# Patient Record
Sex: FEMALE
[De-identification: names, ages, dates, MRNs, and addresses within clinical notes are randomized per-mention and may not be internally consistent; named-entity substitution may affect disease eponyms.]

---

## 2017-06-16 ENCOUNTER — HOSPITAL ENCOUNTER (EMERGENCY)
Dept: HOSPITAL 31 - C.ER | Age: 47
Discharge: HOME | End: 2017-06-16
Payer: MEDICAID

## 2017-06-16 VITALS
SYSTOLIC BLOOD PRESSURE: 154 MMHG | DIASTOLIC BLOOD PRESSURE: 87 MMHG | OXYGEN SATURATION: 100 % | RESPIRATION RATE: 13 BRPM | TEMPERATURE: 97.6 F | HEART RATE: 99 BPM
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
## 2017-06-16 DIAGNOSIS — L08.9: ICD-10-CM

## 2017-06-16 DIAGNOSIS — S10.96XA: Primary | ICD-10-CM

## 2017-06-16 NOTE — C.PDOC
History Of Present Illness


48 y/o female c/o itching to left side of neck since this morning. pt has been 

scratching area incessantly.  pt believes she was bitten by something last 

night. pt denies any difficulty swallowing or  breathing. no fever or chills. 


Time Seen by Provider: 06/16/17 12:27


Chief Complaint (Nursing): Abnormal Skin Integrity


History Per: Patient


History/Exam Limitations: no limitations


Onset/Duration Of Symptoms: Days (1)


Current Symptoms Are (Timing): Worse


Location Of Injury: Left: Neck


Quality Of Symptoms: Painful, Swollen


Severity: Moderate





Past Medical History


Reviewed: Historical Data, Nursing Documentation, Vital Signs


Vital Signs: 


 Last Vital Signs











Temp  97.6 F   06/16/17 12:22


 


Pulse  99 H  06/16/17 12:22


 


Resp  13   06/16/17 12:22


 


BP  154/87 H  06/16/17 12:22


 


Pulse Ox  100   06/16/17 12:54














- Medical History


PMH: HTN


Surgical History: No Surg Hx


Family History: States: Unknown Family Hx





- Social History


Hx Alcohol Use: No


Hx Substance Use: No





Review Of Systems


Constitutional: Negative for: Fever, Chills


ENT: Negative for: Ear Pain, Nose Pain, Mouth Pain, Throat Pain, Throat Swelling


Respiratory: Negative for: Shortness of Breath


Skin: Positive for: Other (itchy area left neck)





Physical Exam





- Physical Exam


Appears: Non-toxic, No Acute Distress


Skin: Normal Color, Warm, Dry, Other (1 cm tall x 4 cm wide area of erythema, 

warmth, edema with punctate janice on left side of neck. )


Head: Atraumatic, Normacephalic


Eye(s): bilateral: Normal Inspection


Oral Mucosa: Moist


Tongue: Normal Appearing


Throat: Normal, No Erythema


Neck: Normal ROM, Other (swelling to left side  neck- see description in skin)





ED Course And Treatment


O2 Sat by Pulse Oximetry: 100





Medical Decision Making


Medical Decision Making: 





pt given rx for topical hydrocortisone for itch and po keflex; pt advised to 

return to ed asap for any worse swelling. difficulty breathing or swallowing. 





Disposition


Counseled Patient/Family Regarding: Diagnosis, Need For Followup, Rx Given





- Disposition


Referrals: 


Casper Paredes MD [Staff Provider] - 


Disposition: HOME/ ROUTINE


Disposition Time: 12:46


Condition: STABLE


Additional Instructions: 


Apply hydrocortisone cream to neck 2 times a day. Apply cold packs to left side 

neck (over thin cloth) several times a day to help with swelling. Take 

antibiotic as prescribed.  FOlow up with Dr Paredes on Monday. Return to ER if 

swelling or redness increase, you develop fever, have any difficulty breathing 

or swallowing. 


Prescriptions: 


Cephalexin [cephalexin] 500 mg PO QID #28 cap


Hydrocortisone 1% Cream [Cortizone 1% Cream] 1 applic TOP BID #1 tube


Instructions:  Insect Bite or Sting (ED)


Forms:  General Discharge Instructions





- Clinical Impression


Clinical Impression: 


 Insect bite of neck, infected

## 2018-07-22 ENCOUNTER — HOSPITAL ENCOUNTER (EMERGENCY)
Dept: HOSPITAL 31 - C.ER | Age: 48
Discharge: HOME | End: 2018-07-22
Payer: MEDICAID

## 2018-07-22 VITALS
SYSTOLIC BLOOD PRESSURE: 137 MMHG | DIASTOLIC BLOOD PRESSURE: 84 MMHG | HEART RATE: 98 BPM | TEMPERATURE: 98.2 F | RESPIRATION RATE: 18 BRPM | OXYGEN SATURATION: 100 %

## 2018-07-22 DIAGNOSIS — K02.9: Primary | ICD-10-CM

## 2018-07-22 NOTE — C.PDOC
History Of Present Illness


48 year old female presents to the ED complaining of dental pain that began 2 

days ago. Patient states she felt pain to the left side of mouth when she was 

eating on Friday night. This morning she woke up with swelling prompting ED 

visit. She denies any fever/chills, trouble breathing or swallowing. She 

reports she will go to the dentist tomorrow. 


Time Seen by Provider: 07/22/18 13:18


Chief Complaint (Nursing): Dental Pain


History Per: Patient


History/Exam Limitations: no limitations


Onset/Duration Of Symptoms: Days


Current Symptoms Are (Timing): Still Present


Severity: Moderate


Quality: Positive for: "Pain"





Past Medical History


Reviewed: Historical Data, Nursing Documentation, Vital Signs


Vital Signs: 


 Last Vital Signs











Temp  98.2 F   07/22/18 13:25


 


Pulse  98 H  07/22/18 13:25


 


Resp  18   07/22/18 14:05


 


BP  137/84   07/22/18 13:25


 


Pulse Ox  100   07/22/18 14:12














- Medical History


PMH: HTN


Surgical History: No Surg Hx


Family History: States: No Known Family Hx





- Social History


Hx Alcohol Use: No


Hx Substance Use: No





Review Of Systems


Except As Marked, All Systems Reviewed And Found Negative.


Constitutional: Negative for: Fever, Chills


ENT: Positive for: Mouth Pain (Left side)


Respiratory: Negative for: Shortness of Breath





Physical Exam





- Physical Exam


Appears: Non-toxic, No Acute Distress


Skin: Normal Color, Warm, Dry


Head: Atraumatic, Normacephalic


Eye(s): bilateral: Normal Inspection, EOMI


Ear(s): Bilateral: Normal


Nose: Normal


Oral Mucosa: Moist


Teeth: No Normal Dentition (Poor dentition ), Caries (Large jayme ), Tender To 

Palpation (Tenderness to left mandibular premolar)


Gingiva: Erythema, Swelling, Tender (Tenderness to left mandibular premolar), 

No Abscess


Throat: No Erythema, No Exudate


Neck: Normal ROM, Supple


Chest: Symmetrical


Cardiovascular: Rhythm Regular


Respiratory: Normal Breath Sounds, No Accessory Muscle Use, Other (Speaking 

full sentences)


Neurological/Psych: Oriented x3, Normal Speech


Gait: Steady





ED Course And Treatment


O2 Sat by Pulse Oximetry: 100 (RA)


Pulse Ox Interpretation: Normal


Progress Note: Patient given Amoxicillin and Tramadol. Patient advised to 

follow up with the dentist  tomorrow for further evaluation.Return to the 

emergency department at any time if symptoms persist or worsen. Patient given 

Rx for Naproxen and Amoxicillin.





Disposition





- Disposition


Disposition: HOME/ ROUTINE


Disposition Time: 13:43


Condition: STABLE


Additional Instructions: 


Follow up with your primary medical doctor or clinic in 2-5 days for further 

evaluation. Take medications as prescribed. Return to the emergency department 

at any time if symptoms persist or worsen.


Prescriptions: 


Amoxicillin 875 mg PO BID #14 tablet


Naproxen [Naprosyn] 1 tab PO BID PRN #20 tab


 PRN Reason: Pain


Instructions:  Dental Pain (DC)


Forms:  [facility] (English)





- Clinical Impression


Clinical Impression: 


 Dental caries








- PA / NP / Resident Statement


MD/DO has reviewed & agrees with the documentation as recorded.





- Scribe Statement


The provider has reviewed the documentation as recorded by the Scribmitra Marcos








All medical record entries made by the Radu were at my direction and 

personally dictated by me. I have reviewed the chart and agree that the record 

accurately reflects my personal performance of the history, physical exam, 

medical decision making, and the department course for this patient. I have 

also personally directed, reviewed, and agree with the discharge instructions 

and disposition.